# Patient Record
Sex: FEMALE | Race: WHITE
[De-identification: names, ages, dates, MRNs, and addresses within clinical notes are randomized per-mention and may not be internally consistent; named-entity substitution may affect disease eponyms.]

---

## 2017-07-05 ENCOUNTER — HOSPITAL ENCOUNTER (INPATIENT)
Dept: HOSPITAL 10 - OBT | Age: 27
LOS: 2 days | Discharge: HOME | End: 2017-07-07
Attending: OBSTETRICS & GYNECOLOGY | Admitting: OBSTETRICS & GYNECOLOGY
Payer: MEDICAID

## 2017-07-05 VITALS — DIASTOLIC BLOOD PRESSURE: 66 MMHG | RESPIRATION RATE: 18 BRPM | HEART RATE: 70 BPM | SYSTOLIC BLOOD PRESSURE: 113 MMHG

## 2017-07-05 VITALS — SYSTOLIC BLOOD PRESSURE: 113 MMHG | DIASTOLIC BLOOD PRESSURE: 64 MMHG | HEART RATE: 66 BPM | RESPIRATION RATE: 20 BRPM

## 2017-07-05 VITALS
BODY MASS INDEX: 26.33 KG/M2 | WEIGHT: 148.59 LBS | BODY MASS INDEX: 26.33 KG/M2 | WEIGHT: 148.59 LBS | HEIGHT: 63 IN | HEIGHT: 63 IN

## 2017-07-05 VITALS — RESPIRATION RATE: 18 BRPM | HEART RATE: 70 BPM | DIASTOLIC BLOOD PRESSURE: 59 MMHG | SYSTOLIC BLOOD PRESSURE: 107 MMHG

## 2017-07-05 VITALS — DIASTOLIC BLOOD PRESSURE: 65 MMHG | RESPIRATION RATE: 18 BRPM | HEART RATE: 75 BPM | SYSTOLIC BLOOD PRESSURE: 109 MMHG

## 2017-07-05 DIAGNOSIS — Z3A.34: ICD-10-CM

## 2017-07-05 LAB
ADD SCAN DIFF: NO
ADD UMIC: YES
APTT BLD: 26.7 SEC (ref 25–35)
BASOPHILS # BLD AUTO: 0 10^3/UL (ref 0–0.1)
BASOPHILS NFR BLD: 0.2 % (ref 0–2)
COLOR UR: YELLOW
EOSINOPHIL # BLD: 0.1 10^3/UL (ref 0–0.5)
EOSINOPHIL NFR BLD: 0.5 % (ref 0–7)
ERYTHROCYTE [DISTWIDTH] IN BLOOD BY AUTOMATED COUNT: 12.7 % (ref 11.5–14.5)
GLUCOSE UR STRIP-MCNC: NEGATIVE MG/DL
HCT VFR BLD CALC: 35.9 % (ref 37–47)
HGB BLD-MCNC: 12.2 G/DL (ref 12–16)
INR PPP: 0.9
KETONES UR STRIP.AUTO-MCNC: NEGATIVE MG/DL
LYMPHOCYTES # BLD AUTO: 1.3 10^3/UL (ref 0.8–2.9)
LYMPHOCYTES NFR BLD AUTO: 13.5 % (ref 15–51)
MCH RBC QN AUTO: 30.7 PG (ref 29–33)
MCHC RBC AUTO-ENTMCNC: 34 G/DL (ref 32–37)
MCV RBC AUTO: 90.2 FL (ref 82–101)
MONOCYTES # BLD: 0.6 10^3/UL (ref 0.3–0.9)
MONOCYTES NFR BLD: 5.7 % (ref 0–11)
NEUTROPHILS # BLD: 7.7 10^3/UL (ref 1.6–7.5)
NEUTROPHILS NFR BLD AUTO: 79.6 % (ref 39–77)
NITRITE UR QL STRIP.AUTO: NEGATIVE MG/DL
NRBC # BLD MANUAL: 0 10^3/UL (ref 0–0)
NRBC BLD QL: 0 /100WBC (ref 0–0)
PLATELET # BLD: 147 10^3/UL (ref 140–415)
PMV BLD AUTO: 12.3 FL (ref 7.4–10.4)
PROTHROMBIN TIME: 12.1 SEC (ref 12.2–14.2)
PT RATIO: 0.9
RBC # BLD AUTO: 3.98 10^6/UL (ref 4.2–5.4)
RBC # UR AUTO: NEGATIVE MG/DL
SQUAMOUS #/AREA URNS HPF: (no result) /HPF
UR ASCORBIC ACID: NEGATIVE MG/DL
UR BILIRUBIN (DIP): NEGATIVE MG/DL
UR CLARITY: (no result)
UR PH (DIP): 7 (ref 5–9)
UR RBC: 0 /HPF (ref 0–5)
UR SPECIFIC GRAVITY (DIP): 1.01 (ref 1–1.03)
UR TOTAL PROTEIN (DIP): NEGATIVE MG/DL
UROBILINOGEN UR STRIP-ACNC: NEGATIVE MG/DL
WBC # BLD AUTO: 9.6 10^3/UL (ref 4.8–10.8)
WBC # UR STRIP: (no result) LEU/UL

## 2017-07-05 PROCEDURE — 87086 URINE CULTURE/COLONY COUNT: CPT

## 2017-07-05 PROCEDURE — 81001 URINALYSIS AUTO W/SCOPE: CPT

## 2017-07-05 PROCEDURE — 80307 DRUG TEST PRSMV CHEM ANLYZR: CPT

## 2017-07-05 PROCEDURE — 90715 TDAP VACCINE 7 YRS/> IM: CPT

## 2017-07-05 PROCEDURE — G0463 HOSPITAL OUTPT CLINIC VISIT: HCPCS

## 2017-07-05 PROCEDURE — 88307 TISSUE EXAM BY PATHOLOGIST: CPT

## 2017-07-05 PROCEDURE — 85610 PROTHROMBIN TIME: CPT

## 2017-07-05 PROCEDURE — 85025 COMPLETE CBC W/AUTO DIFF WBC: CPT

## 2017-07-05 PROCEDURE — 87340 HEPATITIS B SURFACE AG IA: CPT

## 2017-07-05 PROCEDURE — 85730 THROMBOPLASTIN TIME PARTIAL: CPT

## 2017-07-05 PROCEDURE — 86592 SYPHILIS TEST NON-TREP QUAL: CPT

## 2017-07-05 PROCEDURE — 76818 FETAL BIOPHYS PROFILE W/NST: CPT

## 2017-07-05 PROCEDURE — 86900 BLOOD TYPING SEROLOGIC ABO: CPT

## 2017-07-05 PROCEDURE — 86901 BLOOD TYPING SEROLOGIC RH(D): CPT

## 2017-07-05 PROCEDURE — 36415 COLL VENOUS BLD VENIPUNCTURE: CPT

## 2017-07-05 RX ADMIN — PYRIDOXINE HYDROCHLORIDE SCH MLS/HR: 100 INJECTION, SOLUTION INTRAMUSCULAR; INTRAVENOUS at 18:56

## 2017-07-05 RX ADMIN — DOCUSATE SODIUM AND SENNOSIDES SCH TAB: 8.6; 5 TABLET, FILM COATED ORAL at 21:32

## 2017-07-05 RX ADMIN — PYRIDOXINE HYDROCHLORIDE SCH MLS/HR: 100 INJECTION, SOLUTION INTRAMUSCULAR; INTRAVENOUS at 16:34

## 2017-07-05 RX ADMIN — IBUPROFEN SCH MG: 600 TABLET ORAL at 23:04

## 2017-07-05 RX ADMIN — IBUPROFEN SCH MG: 600 TABLET ORAL at 16:47

## 2017-07-05 NOTE — RADRPT
PROCEDURE:   OB ultrasound for biophysical profile 

 

CLINICAL INDICATION:   Contractions

 

TECHNIQUE:   Multiple sonographic images of the pelvis were obtained.  Transabdominal views of the g
ravid uterus are available for review.  The images were reviewed on a PACS workstation.  

 

COMPARISON:   None 

 

FINDINGS:

 

Fetal breathing movement = 2/2

Fetal tone = 2/2

Fetal motion = 2/2

VANESSA = 2/2

 

VANESSA = 11.0 cm

Single live intrauterine pregnancy with fetal cardiac activity of 146 bpm.  Fetal position is cephal
ic.  The placenta is anterior.

 

IMPRESSION:

 

1.  Single live intrauterine gestation.  

2.  Biophysical profile = 8/8.

3.  VANESSA = 11.0 cm. 

 

 

RPTAT: HH

_____________________________________________ 

.Selena Bell MD, MD           Date    Time 

Electronically viewed and signed by .Selena Bell MD, MD on 07/05/2017 10:32 

 

D:  07/05/2017 10:32  T:  07/05/2017 10:32

.G/

## 2017-07-05 NOTE — LDN
Date/Time of Note


Date/Time of Note


DATE: 17 


TIME: 12:45





Delivery Summary





Weeks of Gestation


34 weeks and 1 day


Placenta Delivered:  Spontaneously


Meconium:  none


Episiotomy:  No


Perineal laceration:  0


Anesthesia type:  None


Estimated blood loss:  100


Sponge & Needle done & correct:  Yes


All needle counts correct:  Yes


Any foreign bodies felt in the:  No


Problems:  





Infant Delivery Information


Sex


Infant Sex:  male





Apgars


1 Minute:  9


5 Minute:  9





Suctioning


Nose & mouth suctioned at fatoumata:  Yes


Delee suction performed:  No





Umbilical Cord


Cord presentations:  no nuchal cord


Cord Blood was obtained:  Yes





Mother & Baby Disposition


Disposition


Mom transferred to:  Other (postpartum)


Baby to NICU:  Yes











ERIN BETHEA MD 2017 12:47

## 2017-07-05 NOTE — TRIAGE
===================================

OB Triage

===================================

Datetime Report Generated by CPN: 07/05/2017 11:42

   

   

===========================

Datetime: 07/05/2017 11:08

===========================

   

 Stage of Pregnancy:  Antepartum

   

===================================

Labor Evaluation

===================================

   

 Frequency:  3-4

 Monitor Mode:  External

 Duration (sec)2399:  50-90

 Quality:  Strong

 Pattern:  Normal: <= 5 Contractions in 10 Minutes

 Resting Tone Adwolf:  Relaxed

   

===================================

Fetal Heart Rate

===================================

   

 FHR Baseline Rate:  145

 Monitor Mode:  External US

 Variability:  Moderate 6-25 bpm

 Accelerations:  15X15

 Decelerations:  None

 Category:  Category I

 Comments:  NST REACTIVE FOR GESTATIONAL AGE 

   

===========================

Datetime: 07/05/2017 10:38

===========================

   

   

===================================

Vaginal Exam

===================================

   

 Dilatation (cms):  2.5

 Effacement (%):  80

 Station:  -1

 Exam By:  [email or web address]

 Vaginal Bleeding:  Normal Show

 Cervix, Consistency:  Soft

   

===========================

Datetime: 07/05/2017 09:43

===========================

   

   

===================================

Labor Evaluation

===================================

   

 Frequency:  3

 Monitor Mode:  External

 Duration (sec)2399:  50-80

 Quality:  Moderate

 Pattern:  Normal: <= 5 Contractions in 10 Minutes

 Resting Tone Adwolf:  Relaxed

   

===================================

Fetal Heart Rate

===================================

   

 FHR Baseline Rate:  135

 Monitor Mode:  External US

 Variability:  Moderate 6-25 bpm

 Accelerations:  15X15

 Decelerations:  None

 Category:  Category I

   

===========================

Datetime: 07/05/2017 09:28

===========================

   

   

===================================

Maternal Assessment

===================================

   

 Level of Consciousness:  Fully Conscious

 DTR's/Clonus:  DTRs 2+; No Clonus

 Headache:  Denies

 Blurred Vision:  No

 Respiratory Effort:  Unlabored; Regular Rhythm; Equal Expansion

 Breath Sounds, Left:  Clear and Equal

 Breath Sounds, Right:  Clear and Equal

 Nausea/Vomiting:  Denies

 RUQ Epigastric Pain:  Denies

 Facial Edema:  None

   

===================================

Fall Risk Assessment

===================================

   

 History of Falling:  (0) No

 Secondary Diagnosis:  (0) No

 Ambulatory Aid:  (0) Bedrest/Nurse Assist

 IV Therapy:  (0) No

 Gait:  (0) Normal/Bedrest/Immobile

 Mental Status:  (0) Oriented to Own Ability

 Fall Score:  0

 Fall Risk Score Definition:  No Risk: No action required

   

===========================

Datetime: 07/05/2017 09:27

===========================

   

 Stage of Pregnancy:  OB Triage

   

===================================

Maternal Assessment

===================================

   

 Level of Consciousness:  Fully Conscious

 DTR's/Clonus:  DTRs 2+; No Clonus

 Headache:  Denies

 Blurred Vision:  No

 Respiratory Effort:  Unlabored; Regular Rhythm; Equal Expansion

 Breath Sounds, Left:  Clear and Equal

 Breath Sounds, Right:  Clear and Equal

 Nausea/Vomiting:  Denies

 RUQ Epigastric Pain:  Denies

 Lower Extremities Edema:  None

     Degree:  None

 Upper Extremities Edema:  None

     Degree:  None

 Facial Edema:  None

 Temperature Route:  Axillary

   

===================================

Fall Risk Assessment

===================================

   

 History of Falling:  (0) No

 Secondary Diagnosis:  (0) No

 Ambulatory Aid:  (0) Bedrest/Nurse Assist

 IV Therapy:  (0) No

 Gait:  (0) Normal/Bedrest/Immobile

 Mental Status:  (0) Oriented to Own Ability

 Fall Score:  0

 Fall Risk Score Definition:  No Risk: No action required

   

===========================

Datetime: 07/05/2017 09:26

===========================

   

 Time of Arrival:  07/05/2017 09:10

 EGA:  34.1

 Arrived By:  Ambulatory

 Arrived From:  Home

 Chief Complaint:  UC'S SINCE 0800

 Fetal Movement:  Present

 Contractions:  Regular

 Time Contractions Began:  07/05/2017 08:00

 Contractions:  2-3

 Rupture of Membranes:  Denies

 Vaginal Bleeding:  None

 Vaginal Discharge:  Denies

 Recent Sexual Intercouse:  Denies

 Abdominal Trauma:  Not Applicable

 Patient Complaints:  Contractions

 Time Provider Notified:  07/05/2017 09:53

 Provider Notified:  DR. BETHEA

 Initial Plan:  NST AND CALL MD

## 2017-07-05 NOTE — QN
Documentation


Comment


27 years old  with IUP at 34 weeks and 1 day with prenatal care with Dr. Manrique. presented with  contractions in triage. She was initally 3 cm 

and rapidly progresssed to 8 cm and 100 % when I was called.


I was called to evaluate since the nursing staff felt a soft area at the cevix 

. Questionable for placenta


Attended to patient's bedside.


Complaining of contractions and pain.  Declined IV pain medication quick 

bedside ultrasound performed.  Vertex presentation.  Placenta fundal anterior.





Speculum examination: Bulging bag of water noted.  No clear evidence of 

placenta noted.


exam 8/100%./0 


NST: Category 1


Dr. sherrie browne primary obstetrician is on her way.  Patient received a dose of 

steroid





Due to advanced cervical dilatation magnesium that was a started stopped.  

Ampicillin started immediately., 


NICU was notified.


Prenatal records reviewed.  Dating good by LMP consistent with 30 weeks 

ultrasound.  Patient had good prenatal care.  There is no


Antepartum complication.


Anticipate 


Primary obstetrician it is on her way to perform the delivery


We will continue to standby











ROBERTO ALVARADO MD 2017 12:25

## 2017-07-05 NOTE — HP
Date/Time of Note


Date/Time of Note


DATE: 17 


TIME: 12:40





OB - History


Hx of Present Pregnancy


Chief Complaint:  contractions


Estimated Due Date:  Aug 15, 2017


:  3


Para:  1


Spontaneous :  1


Therapeutic :  1


Prenatal Care:  Good Care


Ultrasounds:  Normal mid trimester US


Obstetrical Complications:  None


Medical Complications:  None





Past Family/Social History


*


Past Medical, Surgical, Family and Obstetric Histories reviewed from prenatal 

chart.


GBS Status:  Unknown





OB  Admission Exam


Vital Signs


Vital Signs





 Vital Signs








  Date Time  Temp Pulse Resp B/P Pulse Ox O2 Delivery O2 Flow Rate FiO2


 


17 09:58 97.8 66 20 113/64 99 Room Air  











Physical Exam


HEENT:  WNL


Heart:  Rhythm Normal


Lungs:  Clear


Abdomen:  WNL


Extremities:  Normal


Cervical Dilatation:  3cm


Effacement:  75%


Station:  -1


Membranes:  Intact


Fetal Heart Rate:  130's


Accelerations:  Accelerations Present


Decelerations:  No Decelerations


Varibility:  Moderate


Last 72 hours Lab Results


 CBC & BMP


17 10:45











OB  Assessment/Plan


Reason for admission:   labor


Plan:  Other (Admit, tocolysis with magnesium sulfate attewpted, however, 

patient progressed rapidly. Expect .)











ERIN BETHEA MD 2017 12:44

## 2017-07-06 VITALS — RESPIRATION RATE: 14 BRPM | HEART RATE: 66 BPM | SYSTOLIC BLOOD PRESSURE: 102 MMHG | DIASTOLIC BLOOD PRESSURE: 57 MMHG

## 2017-07-06 VITALS — HEART RATE: 68 BPM | DIASTOLIC BLOOD PRESSURE: 64 MMHG | RESPIRATION RATE: 17 BRPM | SYSTOLIC BLOOD PRESSURE: 106 MMHG

## 2017-07-06 VITALS — SYSTOLIC BLOOD PRESSURE: 103 MMHG | DIASTOLIC BLOOD PRESSURE: 61 MMHG | RESPIRATION RATE: 74 BRPM | HEART RATE: 74 BPM

## 2017-07-06 VITALS — DIASTOLIC BLOOD PRESSURE: 59 MMHG | RESPIRATION RATE: 18 BRPM | HEART RATE: 18 BPM | SYSTOLIC BLOOD PRESSURE: 103 MMHG

## 2017-07-06 VITALS — HEART RATE: 74 BPM | RESPIRATION RATE: 16 BRPM | SYSTOLIC BLOOD PRESSURE: 90 MMHG | DIASTOLIC BLOOD PRESSURE: 55 MMHG

## 2017-07-06 LAB
ADD SCAN DIFF: NO
BASOPHILS # BLD AUTO: 0 10^3/UL (ref 0–0.1)
BASOPHILS NFR BLD: 0.2 % (ref 0–2)
EOSINOPHIL # BLD: 0 10^3/UL (ref 0–0.5)
EOSINOPHIL NFR BLD: 0.1 % (ref 0–7)
ERYTHROCYTE [DISTWIDTH] IN BLOOD BY AUTOMATED COUNT: 12.9 % (ref 11.5–14.5)
HCT VFR BLD CALC: 30.8 % (ref 37–47)
HGB BLD-MCNC: 10.3 G/DL (ref 12–16)
LYMPHOCYTES # BLD AUTO: 1.3 10^3/UL (ref 0.8–2.9)
LYMPHOCYTES NFR BLD AUTO: 7.1 % (ref 15–51)
MCH RBC QN AUTO: 30.3 PG (ref 29–33)
MCHC RBC AUTO-ENTMCNC: 33.4 G/DL (ref 32–37)
MCV RBC AUTO: 90.6 FL (ref 82–101)
MONOCYTES # BLD: 0.9 10^3/UL (ref 0.3–0.9)
MONOCYTES NFR BLD: 5 % (ref 0–11)
NEUTROPHILS # BLD: 15.6 10^3/UL (ref 1.6–7.5)
NEUTROPHILS NFR BLD AUTO: 87.2 % (ref 39–77)
NRBC # BLD MANUAL: 0 10^3/UL (ref 0–0)
NRBC BLD QL: 0 /100WBC (ref 0–0)
PLATELET # BLD: 137 10^3/UL (ref 140–415)
PMV BLD AUTO: 11.7 FL (ref 7.4–10.4)
RBC # BLD AUTO: 3.4 10^6/UL (ref 4.2–5.4)
WBC # BLD AUTO: 17.9 10^3/UL (ref 4.8–10.8)

## 2017-07-06 RX ADMIN — IBUPROFEN SCH MG: 600 TABLET ORAL at 11:59

## 2017-07-06 RX ADMIN — IBUPROFEN SCH MG: 600 TABLET ORAL at 18:13

## 2017-07-06 RX ADMIN — IBUPROFEN SCH MG: 600 TABLET ORAL at 23:42

## 2017-07-06 RX ADMIN — IBUPROFEN SCH MG: 600 TABLET ORAL at 05:36

## 2017-07-06 RX ADMIN — DOCUSATE SODIUM AND SENNOSIDES SCH TAB: 8.6; 5 TABLET, FILM COATED ORAL at 09:05

## 2017-07-06 RX ADMIN — DOCUSATE SODIUM AND SENNOSIDES SCH TAB: 8.6; 5 TABLET, FILM COATED ORAL at 23:42

## 2017-07-06 NOTE — QN
Documentation


Comment


No complaint


Afebrile VSS


Fundus Firm


Lochia scant


PPD #1


Stable


Continue present care.











ERIN BETHEA MD Jul 6, 2017 19:12

## 2017-07-07 VITALS — RESPIRATION RATE: 18 BRPM | SYSTOLIC BLOOD PRESSURE: 118 MMHG | DIASTOLIC BLOOD PRESSURE: 77 MMHG | HEART RATE: 77 BPM

## 2017-07-07 VITALS — SYSTOLIC BLOOD PRESSURE: 99 MMHG | HEART RATE: 56 BPM | DIASTOLIC BLOOD PRESSURE: 62 MMHG | RESPIRATION RATE: 18 BRPM

## 2017-07-07 VITALS — HEART RATE: 65 BPM | SYSTOLIC BLOOD PRESSURE: 105 MMHG | RESPIRATION RATE: 19 BRPM | DIASTOLIC BLOOD PRESSURE: 69 MMHG

## 2017-07-07 LAB
ADD SCAN DIFF: NO
BASOPHILS # BLD AUTO: 0 10^3/UL (ref 0–0.1)
BASOPHILS NFR BLD: 0.3 % (ref 0–2)
EOSINOPHIL # BLD: 0.2 10^3/UL (ref 0–0.5)
EOSINOPHIL NFR BLD: 1.3 % (ref 0–7)
ERYTHROCYTE [DISTWIDTH] IN BLOOD BY AUTOMATED COUNT: 13.1 % (ref 11.5–14.5)
HCT VFR BLD CALC: 30.8 % (ref 37–47)
HGB BLD-MCNC: 10 G/DL (ref 12–16)
LYMPHOCYTES # BLD AUTO: 2 10^3/UL (ref 0.8–2.9)
LYMPHOCYTES NFR BLD AUTO: 16.6 % (ref 15–51)
MCH RBC QN AUTO: 29.7 PG (ref 29–33)
MCHC RBC AUTO-ENTMCNC: 32.5 G/DL (ref 32–37)
MCV RBC AUTO: 91.4 FL (ref 82–101)
MONOCYTES # BLD: 0.7 10^3/UL (ref 0.3–0.9)
MONOCYTES NFR BLD: 5.5 % (ref 0–11)
NEUTROPHILS # BLD: 9.1 10^3/UL (ref 1.6–7.5)
NEUTROPHILS NFR BLD AUTO: 75.5 % (ref 39–77)
NRBC # BLD MANUAL: 0 10^3/UL (ref 0–0)
NRBC BLD QL: 0 /100WBC (ref 0–0)
PLATELET # BLD: 139 10^3/UL (ref 140–415)
PMV BLD AUTO: 11.8 FL (ref 7.4–10.4)
RBC # BLD AUTO: 3.37 10^6/UL (ref 4.2–5.4)
WBC # BLD AUTO: 12 10^3/UL (ref 4.8–10.8)

## 2017-07-07 RX ADMIN — IBUPROFEN SCH MG: 600 TABLET ORAL at 17:48

## 2017-07-07 RX ADMIN — IBUPROFEN SCH MG: 600 TABLET ORAL at 12:15

## 2017-07-07 RX ADMIN — DOCUSATE SODIUM AND SENNOSIDES SCH TAB: 8.6; 5 TABLET, FILM COATED ORAL at 08:19

## 2017-07-07 RX ADMIN — IBUPROFEN SCH MG: 600 TABLET ORAL at 05:47

## 2017-07-07 NOTE — DS
Date/Time of Note


Date/Time of Note


DATE: 17 


TIME: 19:55





Obstetrical Discharge Record


Final Diagnosis


Final Diagnosis:   delivered





Vaginal Delivery


Obstetrical Delivery:  Spontaneous





Complications


 Labor


Tocolytics:  Magnesium Sulfate





Condition on Discharge


Physical Assessment


Voiding:  Yes


Bowel Movement:  Yes


Breast:  Soft, non-tender


Fundus:  Firm


Calf Tenderness:  No


Patient Condition:  Stable











ERIN BETHEA MD 2017 19:56

## 2019-03-04 ENCOUNTER — HOSPITAL ENCOUNTER (INPATIENT)
Dept: HOSPITAL 10 - L-D | Age: 29
LOS: 3 days | Discharge: HOME | End: 2019-03-07
Attending: OBSTETRICS & GYNECOLOGY | Admitting: OBSTETRICS & GYNECOLOGY
Payer: MEDICAID

## 2019-03-04 ENCOUNTER — HOSPITAL ENCOUNTER (INPATIENT)
Dept: HOSPITAL 91 - L-D | Age: 29
LOS: 3 days | Discharge: HOME | End: 2019-03-07
Payer: MEDICAID

## 2019-03-04 VITALS
WEIGHT: 160.94 LBS | HEIGHT: 63 IN | WEIGHT: 160.94 LBS | BODY MASS INDEX: 28.52 KG/M2 | BODY MASS INDEX: 28.52 KG/M2 | HEIGHT: 63 IN

## 2019-03-04 DIAGNOSIS — Z23: ICD-10-CM

## 2019-03-04 DIAGNOSIS — Z3A.38: ICD-10-CM

## 2019-03-04 LAB
ADD MAN DIFF?: NO
BASOPHIL #: 0 10^3/UL (ref 0–0.1)
BASOPHILS %: 0.3 % (ref 0–2)
EOSINOPHILS #: 0.1 10^3/UL (ref 0–0.5)
EOSINOPHILS %: 1.6 % (ref 0–7)
GLUCOSE: 86 MG/DL (ref 70–220)
HEMATOCRIT: 32.8 % (ref 37–47)
HEMOGLOBIN: 10.4 G/DL (ref 12–16)
HIV 1&2 ANTIBODY: NEGATIVE
IMMATURE GRANS #M: 0.03 10^3/UL (ref 0–0.03)
IMMATURE GRANS % (M): 0.5 % (ref 0–0.43)
INR: 0.94
LYMPHOCYTES #: 1 10^3/UL (ref 0.8–2.9)
LYMPHOCYTES %: 16.2 % (ref 15–51)
MEAN CORPUSCULAR HEMOGLOBIN: 27.2 PG (ref 29–33)
MEAN CORPUSCULAR HGB CONC: 31.7 G/DL (ref 32–37)
MEAN CORPUSCULAR VOLUME: 85.6 FL (ref 82–101)
MEAN PLATELET VOLUME: 11.9 FL (ref 7.4–10.4)
MONOCYTE #: 0.5 10^3/UL (ref 0.3–0.9)
MONOCYTES %: 7.3 % (ref 0–11)
NEUTROPHIL #: 4.7 10^3/UL (ref 1.6–7.5)
NEUTROPHILS %: 74.1 % (ref 39–77)
NUCLEATED RED BLOOD CELLS #: 0 10^3/UL (ref 0–0)
NUCLEATED RED BLOOD CELLS%: 0 /100WBC (ref 0–0)
PARTIAL THROMBOPLASTIN TIME: 24.7 SEC (ref 23–35)
PLATELET COUNT: 146 10^3/UL (ref 140–415)
PROTIME: 12.7 SEC (ref 11.9–14.9)
PT RATIO: 1
RAPID PLASMA REAGIN: NONREACTIVE
RED BLOOD COUNT: 3.83 10^6/UL (ref 4.2–5.4)
RED CELL DISTRIBUTION WIDTH: 14.6 % (ref 11.5–14.5)
WHITE BLOOD COUNT: 6.3 10^3/UL (ref 4.8–10.8)

## 2019-03-04 PROCEDURE — 82947 ASSAY GLUCOSE BLOOD QUANT: CPT

## 2019-03-04 PROCEDURE — 62319: CPT

## 2019-03-04 PROCEDURE — 85610 PROTHROMBIN TIME: CPT

## 2019-03-04 PROCEDURE — 80053 COMPREHEN METABOLIC PANEL: CPT

## 2019-03-04 PROCEDURE — 81001 URINALYSIS AUTO W/SCOPE: CPT

## 2019-03-04 PROCEDURE — 84560 ASSAY OF URINE/URIC ACID: CPT

## 2019-03-04 PROCEDURE — 86703 HIV-1/HIV-2 1 RESULT ANTBDY: CPT

## 2019-03-04 PROCEDURE — 85025 COMPLETE CBC W/AUTO DIFF WBC: CPT

## 2019-03-04 PROCEDURE — 82962 GLUCOSE BLOOD TEST: CPT

## 2019-03-04 PROCEDURE — 85730 THROMBOPLASTIN TIME PARTIAL: CPT

## 2019-03-04 PROCEDURE — 90715 TDAP VACCINE 7 YRS/> IM: CPT

## 2019-03-04 PROCEDURE — 86900 BLOOD TYPING SEROLOGIC ABO: CPT

## 2019-03-04 PROCEDURE — 86592 SYPHILIS TEST NON-TREP QUAL: CPT

## 2019-03-04 PROCEDURE — 86850 RBC ANTIBODY SCREEN: CPT

## 2019-03-04 PROCEDURE — 86901 BLOOD TYPING SEROLOGIC RH(D): CPT

## 2019-03-04 PROCEDURE — 87340 HEPATITIS B SURFACE AG IA: CPT

## 2019-03-04 RX ADMIN — PYRIDOXINE HYDROCHLORIDE SCH MLS/HR: 100 INJECTION, SOLUTION INTRAMUSCULAR; INTRAVENOUS at 13:24

## 2019-03-04 RX ADMIN — PYRIDOXINE HYDROCHLORIDE SCH MLS/HR: 100 INJECTION, SOLUTION INTRAMUSCULAR; INTRAVENOUS at 20:50

## 2019-03-04 RX ADMIN — OXYTOCIN 1 MLS/HR: 10 INJECTION, SOLUTION INTRAMUSCULAR; INTRAVENOUS at 17:25

## 2019-03-04 RX ADMIN — PYRIDOXINE HYDROCHLORIDE 1 MLS/HR: 100 INJECTION, SOLUTION INTRAMUSCULAR; INTRAVENOUS at 20:50

## 2019-03-04 RX ADMIN — POTASSIUM CHLORIDE SCH MLS/HR: 2 INJECTION, SOLUTION, CONCENTRATE INTRAVENOUS at 17:25

## 2019-03-04 RX ADMIN — PYRIDOXINE HYDROCHLORIDE 1 MLS/HR: 100 INJECTION, SOLUTION INTRAMUSCULAR; INTRAVENOUS at 13:24

## 2019-03-04 NOTE — HP
Date/Time of Note


Date/Time of Note


DATE: 3/4/19 


TIME: 18:41





OB - History


Hx of Present Pregnancy


Chief Complaint:  referred from NST


Estimated Due Date:  Mar 19, 2019


:  4


Para:  2


Spontaneous :  1


Therapeutic :  0


Prenatal Care:  Good Care


Ultrasounds:  Normal mid trimester US


Obstetrical Complications:  Gestational Diabetes





Past Family/Social History


*


Past Medical, Surgical, Family and Obstetric Histories reviewed from prenatal 


chart.


GBS Status:  Negative





OB  Admission Exam


Physical Exam


HEENT:  WNL


Heart:  Rhythm Normal


Lungs:  Clear, Equal


Abdomen:  WNL


Extremities:  Normal


Reflexes:  Normal


Cervical Dilatation:  3cm


Effacement:  75%


Station:  -1


Membranes:  Intact


Fetal Heart Rate:  120's


Accelerations:  Accelerations Present


Decelerations:  Variable Decelerations


Varibility:  Moderate


Last 72 hourBlood Glucose





Bedside Glucose - 72 Hours


                      Test
                  3/4/19
17:28


                      Bedside Glucose
  90 mg/dL
()





Last 72 hours Lab Results


                                    CBC & BMP


3/4/19 13:00











OB  Assessment/Plan


Other Assessment:


Early labor


Fetal heart deceleration noted in NST clinic


Plan:  Expectant Management











ERIN BETHEA MD             Mar 4, 2019 18:46

## 2019-03-05 VITALS — RESPIRATION RATE: 18 BRPM | SYSTOLIC BLOOD PRESSURE: 124 MMHG | HEART RATE: 81 BPM | DIASTOLIC BLOOD PRESSURE: 67 MMHG

## 2019-03-05 VITALS — SYSTOLIC BLOOD PRESSURE: 121 MMHG | DIASTOLIC BLOOD PRESSURE: 68 MMHG | RESPIRATION RATE: 18 BRPM | HEART RATE: 89 BPM

## 2019-03-05 LAB
ADD MAN DIFF?: NO
ADD UMIC: YES
ALANINE AMINOTRANSFERASE: < 6 IU/L (ref 13–69)
ALBUMIN/GLOBULIN RATIO: 0.96
ALBUMIN: 3.1 G/DL (ref 3.3–4.9)
ALKALINE PHOSPHATASE: 155 IU/L (ref 42–121)
ANION GAP: 9 (ref 5–13)
ASPARTATE AMINO TRANSFERASE: 19 IU/L (ref 15–46)
BASOPHIL #: 0 10^3/UL (ref 0–0.1)
BASOPHILS %: 0.2 % (ref 0–2)
BILIRUBIN,DIRECT: 0 MG/DL (ref 0–0.2)
BILIRUBIN,TOTAL: 0.1 MG/DL (ref 0.2–1.3)
BLOOD UREA NITROGEN: 6 MG/DL (ref 7–20)
CALCIUM: 8.8 MG/DL (ref 8.4–10.2)
CARBON DIOXIDE: 24 MMOL/L (ref 21–31)
CHLORIDE: 105 MMOL/L (ref 97–110)
CREATININE: 0.5 MG/DL (ref 0.44–1)
EOSINOPHILS #: 0 10^3/UL (ref 0–0.5)
EOSINOPHILS %: 0.4 % (ref 0–7)
GLOBULIN: 3.2 G/DL (ref 1.3–3.2)
GLUCOSE: 86 MG/DL (ref 70–220)
HEMATOCRIT: 34.5 % (ref 37–47)
HEMOGLOBIN: 10.9 G/DL (ref 12–16)
HEPATITIS B SURFACE ANTIGEN: NEGATIVE
IMMATURE GRANS #M: 0.06 10^3/UL (ref 0–0.03)
IMMATURE GRANS % (M): 0.5 % (ref 0–0.43)
LYMPHOCYTES #: 1 10^3/UL (ref 0.8–2.9)
LYMPHOCYTES %: 8.7 % (ref 15–51)
MEAN CORPUSCULAR HEMOGLOBIN: 27.3 PG (ref 29–33)
MEAN CORPUSCULAR HGB CONC: 31.6 G/DL (ref 32–37)
MEAN CORPUSCULAR VOLUME: 86.5 FL (ref 82–101)
MEAN PLATELET VOLUME: 12.1 FL (ref 7.4–10.4)
MONOCYTE #: 0.4 10^3/UL (ref 0.3–0.9)
MONOCYTES %: 3.3 % (ref 0–11)
NEUTROPHIL #: 9.5 10^3/UL (ref 1.6–7.5)
NEUTROPHILS %: 86.9 % (ref 39–77)
NUCLEATED RED BLOOD CELLS #: 0 10^3/UL (ref 0–0)
NUCLEATED RED BLOOD CELLS%: 0 /100WBC (ref 0–0)
PLATELET COUNT: 128 10^3/UL (ref 140–415)
POTASSIUM: 4 MMOL/L (ref 3.5–5.1)
RED BLOOD COUNT: 3.99 10^6/UL (ref 4.2–5.4)
RED CELL DISTRIBUTION WIDTH: 14.7 % (ref 11.5–14.5)
SODIUM: 138 MMOL/L (ref 135–144)
TOTAL PROTEIN: 6.3 G/DL (ref 6.1–8.1)
UR ASCORBIC ACID: NEGATIVE MG/DL
UR BACTERIA: (no result) /HPF
UR BILIRUBIN (DIP): NEGATIVE MG/DL
UR BLOOD (DIP): (no result) MG/DL
UR CLARITY: CLEAR
UR COLOR: COLORLESS
UR GLUCOSE (DIP): NEGATIVE MG/DL
UR KETONES (DIP): NEGATIVE MG/DL
UR LEUKOCYTE ESTERASE (DIP): NEGATIVE LEU/UL
UR NITRITE (DIP): NEGATIVE MG/DL
UR PH (DIP): 7 (ref 5–9)
UR RBC: 1 /HPF (ref 0–5)
UR SPECIFIC GRAVITY (DIP): 1 (ref 1–1.03)
UR TOTAL PROTEIN (DIP): NEGATIVE MG/DL
UR UROBILINOGEN (DIP): NEGATIVE MG/DL
UR WBC: 1 /HPF (ref 0–5)
URIC ACID: 6.1 MG/DL (ref 3.1–7.9)
WHITE BLOOD COUNT: 10.9 10^3/UL (ref 4.8–10.8)

## 2019-03-05 RX ADMIN — FENTANYL CIT 0.2 MG/100ML-ROPIV 0.2%-NACL 0.9% EPIDURAL INJ SCH ML: 2/0.2 SOLUTION at 10:23

## 2019-03-05 RX ADMIN — POTASSIUM CHLORIDE SCH MLS/HR: 2 INJECTION, SOLUTION, CONCENTRATE INTRAVENOUS at 17:30

## 2019-03-05 RX ADMIN — OXYTOCIN 1 MLS/HR: 10 INJECTION, SOLUTION INTRAMUSCULAR; INTRAVENOUS at 17:30

## 2019-03-05 RX ADMIN — PYRIDOXINE HYDROCHLORIDE 1 MLS/HR: 100 INJECTION, SOLUTION INTRAMUSCULAR; INTRAVENOUS at 00:12

## 2019-03-05 RX ADMIN — PYRIDOXINE HYDROCHLORIDE SCH MLS/HR: 100 INJECTION, SOLUTION INTRAMUSCULAR; INTRAVENOUS at 10:21

## 2019-03-05 RX ADMIN — POTASSIUM CHLORIDE SCH MLS/HR: 2 INJECTION, SOLUTION, CONCENTRATE INTRAVENOUS at 00:00

## 2019-03-05 RX ADMIN — Medication 1 MLS/HR: at 22:04

## 2019-03-05 RX ADMIN — HYDROCODONE BITARTRATE AND ACETAMINOPHEN 1 TAB: 5; 325 TABLET ORAL at 22:00

## 2019-03-05 RX ADMIN — Medication 1 SPRAY: at 23:17

## 2019-03-05 RX ADMIN — PYRIDOXINE HYDROCHLORIDE 1 MLS/HR: 100 INJECTION, SOLUTION INTRAMUSCULAR; INTRAVENOUS at 21:05

## 2019-03-05 RX ADMIN — PYRIDOXINE HYDROCHLORIDE SCH MLS/HR: 100 INJECTION, SOLUTION INTRAMUSCULAR; INTRAVENOUS at 21:05

## 2019-03-05 RX ADMIN — IBUPROFEN 1 MG: 600 TABLET ORAL at 23:32

## 2019-03-05 RX ADMIN — FENTANYL CIT 0.2 MG/100ML-ROPIV 0.2%-NACL 0.9% EPIDURAL INJ 1 ML: 2/0.2 SOLUTION at 02:06

## 2019-03-05 RX ADMIN — IBUPROFEN SCH MG: 600 TABLET ORAL at 23:32

## 2019-03-05 RX ADMIN — WITCH HAZEL 1 PAD: 500 SOLUTION RECTAL; TOPICAL at 23:17

## 2019-03-05 RX ADMIN — PYRIDOXINE HYDROCHLORIDE 1 MLS/HR: 100 INJECTION, SOLUTION INTRAMUSCULAR; INTRAVENOUS at 10:21

## 2019-03-05 RX ADMIN — OXYTOCIN 1 MLS/HR: 10 INJECTION, SOLUTION INTRAMUSCULAR; INTRAVENOUS at 00:00

## 2019-03-05 RX ADMIN — OXYTOCIN 1 MLS/HR: 10 INJECTION, SOLUTION INTRAMUSCULAR; INTRAVENOUS at 07:21

## 2019-03-05 RX ADMIN — PYRIDOXINE HYDROCHLORIDE SCH MLS/HR: 100 INJECTION, SOLUTION INTRAMUSCULAR; INTRAVENOUS at 00:12

## 2019-03-05 RX ADMIN — FENTANYL CIT 0.2 MG/100ML-ROPIV 0.2%-NACL 0.9% EPIDURAL INJ SCH ML: 2/0.2 SOLUTION at 02:06

## 2019-03-05 RX ADMIN — POTASSIUM CHLORIDE SCH MLS/HR: 2 INJECTION, SOLUTION, CONCENTRATE INTRAVENOUS at 07:21

## 2019-03-05 RX ADMIN — Medication 1 MLS/HR: at 11:53

## 2019-03-05 RX ADMIN — Medication 1 MLS/HR: at 17:54

## 2019-03-05 RX ADMIN — IBUPROFEN 1 MG: 600 TABLET ORAL at 20:08

## 2019-03-05 RX ADMIN — Medication 1 MLS/HR: at 17:50

## 2019-03-05 RX ADMIN — HYDROCODONE BITARTRATE AND ACETAMINOPHEN PRN TAB: 5; 325 TABLET ORAL at 22:00

## 2019-03-05 RX ADMIN — FENTANYL CIT 0.2 MG/100ML-ROPIV 0.2%-NACL 0.9% EPIDURAL INJ 1 ML: 2/0.2 SOLUTION at 10:23

## 2019-03-05 NOTE — PAC
Date/Time of Note


Date/Time of Note


DATE: 3/5/19 


TIME: 01:32





Post-Anesthesia Notes


Post-Anesthesia Note


Last documented vital signs


T:98.1


Activity:  WNL


Respiratory function:  WNL


Cardiovascular function:  WNL


Mental status:  Baseline


Pain reasonably controlled:  Yes


Hydration appropriate:  Yes


Nausea/Vomiting absent:  Yes











ALISHA CORREA MD               Mar 5, 2019 01:33

## 2019-03-05 NOTE — LDN
Date/Time of Note


Date/Time of Note


DATE: 3/5/19 


TIME: 17:46





Delivery Summary





Weeks of Gestation


38 weeks


Placenta Delivered:  Spontaneously


Meconium:  none


Episiotomy:  No


Anesthesia type:  Epidural


Estimated blood loss:  200


Sponge & Needle done & correct:  Yes


All needle counts correct:  Yes


Any foreign bodies felt in the:  No





Infant Delivery Information


Sex


Infant Sex:  female





Apgars


1 Minute:  9


5 Minute:  9





Suctioning


Nose & mouth suctioned at fatoumata:  No


Delee suction performed:  No





Umbilical Cord


Umbilical cord with:  3 Vessels


Cord presentations:  nuchal cord


Nuchal cord present X:  1


Cord Blood was obtained:  Yes





Mother & Baby Disposition


Disposition


Mom & Baby to Maternity; Good:  Yes











ERIN BETHEA MD             Mar 5, 2019 17:48

## 2019-03-06 VITALS — HEART RATE: 76 BPM | RESPIRATION RATE: 16 BRPM | DIASTOLIC BLOOD PRESSURE: 62 MMHG | SYSTOLIC BLOOD PRESSURE: 95 MMHG

## 2019-03-06 VITALS — RESPIRATION RATE: 18 BRPM | HEART RATE: 65 BPM | SYSTOLIC BLOOD PRESSURE: 112 MMHG | DIASTOLIC BLOOD PRESSURE: 66 MMHG

## 2019-03-06 VITALS — RESPIRATION RATE: 18 BRPM | DIASTOLIC BLOOD PRESSURE: 58 MMHG | HEART RATE: 68 BPM | SYSTOLIC BLOOD PRESSURE: 100 MMHG

## 2019-03-06 VITALS — SYSTOLIC BLOOD PRESSURE: 115 MMHG | DIASTOLIC BLOOD PRESSURE: 74 MMHG | HEART RATE: 70 BPM

## 2019-03-06 VITALS — RESPIRATION RATE: 18 BRPM | HEART RATE: 72 BPM | SYSTOLIC BLOOD PRESSURE: 117 MMHG | DIASTOLIC BLOOD PRESSURE: 63 MMHG

## 2019-03-06 LAB
ADD MAN DIFF?: NO
BASOPHIL #: 0 10^3/UL (ref 0–0.1)
BASOPHILS %: 0.3 % (ref 0–2)
EOSINOPHILS #: 0.1 10^3/UL (ref 0–0.5)
EOSINOPHILS %: 0.9 % (ref 0–7)
HEMATOCRIT: 32.1 % (ref 37–47)
HEMOGLOBIN: 10.2 G/DL (ref 12–16)
IMMATURE GRANS #M: 0.06 10^3/UL (ref 0–0.03)
IMMATURE GRANS % (M): 0.7 % (ref 0–0.43)
LYMPHOCYTES #: 0.8 10^3/UL (ref 0.8–2.9)
LYMPHOCYTES %: 9 % (ref 15–51)
MEAN CORPUSCULAR HEMOGLOBIN: 26.6 PG (ref 29–33)
MEAN CORPUSCULAR HGB CONC: 31.8 G/DL (ref 32–37)
MEAN CORPUSCULAR VOLUME: 83.8 FL (ref 82–101)
MEAN PLATELET VOLUME: 12.1 FL (ref 7.4–10.4)
MONOCYTE #: 0.5 10^3/UL (ref 0.3–0.9)
MONOCYTES %: 5.8 % (ref 0–11)
NEUTROPHIL #: 7.7 10^3/UL (ref 1.6–7.5)
NEUTROPHILS %: 83.3 % (ref 39–77)
NUCLEATED RED BLOOD CELLS #: 0 10^3/UL (ref 0–0)
NUCLEATED RED BLOOD CELLS%: 0 /100WBC (ref 0–0)
PLATELET COUNT: 108 10^3/UL (ref 140–415)
RED BLOOD COUNT: 3.83 10^6/UL (ref 4.2–5.4)
RED CELL DISTRIBUTION WIDTH: 14.7 % (ref 11.5–14.5)
WHITE BLOOD COUNT: 9.2 10^3/UL (ref 4.8–10.8)

## 2019-03-06 RX ADMIN — IBUPROFEN 1 MG: 600 TABLET ORAL at 12:11

## 2019-03-06 RX ADMIN — IBUPROFEN 1 MG: 600 TABLET ORAL at 05:18

## 2019-03-06 RX ADMIN — IBUPROFEN 1 MG: 600 TABLET ORAL at 18:24

## 2019-03-06 RX ADMIN — IBUPROFEN SCH MG: 600 TABLET ORAL at 12:11

## 2019-03-06 RX ADMIN — HYDROCODONE BITARTRATE AND ACETAMINOPHEN PRN TAB: 5; 325 TABLET ORAL at 08:21

## 2019-03-06 RX ADMIN — DIBUCAINE 1 APPLIC: 0.28 OINTMENT TOPICAL at 08:22

## 2019-03-06 RX ADMIN — DOCUSATE SODIUM AND SENNOSIDES SCH TAB: 8.6; 5 TABLET, FILM COATED ORAL at 20:40

## 2019-03-06 RX ADMIN — IBUPROFEN SCH MG: 600 TABLET ORAL at 18:24

## 2019-03-06 RX ADMIN — PYRIDOXINE HYDROCHLORIDE 1 MLS/HR: 100 INJECTION, SOLUTION INTRAMUSCULAR; INTRAVENOUS at 04:41

## 2019-03-06 RX ADMIN — IBUPROFEN SCH MG: 600 TABLET ORAL at 05:18

## 2019-03-06 RX ADMIN — DOCUSATE SODIUM AND SENNOSIDES 1 TAB: 8.6; 5 TABLET, FILM COATED ORAL at 08:20

## 2019-03-06 RX ADMIN — PYRIDOXINE HYDROCHLORIDE SCH MLS/HR: 100 INJECTION, SOLUTION INTRAMUSCULAR; INTRAVENOUS at 04:41

## 2019-03-06 RX ADMIN — DOCUSATE SODIUM AND SENNOSIDES SCH TAB: 8.6; 5 TABLET, FILM COATED ORAL at 08:20

## 2019-03-06 RX ADMIN — DOCUSATE SODIUM AND SENNOSIDES 1 TAB: 8.6; 5 TABLET, FILM COATED ORAL at 20:40

## 2019-03-06 RX ADMIN — HYDROCODONE BITARTRATE AND ACETAMINOPHEN 1 TAB: 5; 325 TABLET ORAL at 08:21

## 2019-03-06 NOTE — QN
Documentation


Comment


No complaint


Afebrile VSS


Fundus firm


Lochia scant


PPD #1


Stable


Continue present care.











ERIN BETHEA MD             Mar 6, 2019 19:47

## 2019-03-07 VITALS — SYSTOLIC BLOOD PRESSURE: 124 MMHG | HEART RATE: 62 BPM | RESPIRATION RATE: 18 BRPM | DIASTOLIC BLOOD PRESSURE: 78 MMHG

## 2019-03-07 VITALS — HEART RATE: 68 BPM | DIASTOLIC BLOOD PRESSURE: 68 MMHG | RESPIRATION RATE: 18 BRPM | SYSTOLIC BLOOD PRESSURE: 110 MMHG

## 2019-03-07 LAB
ADD MAN DIFF?: NO
BASOPHIL #: 0 10^3/UL (ref 0–0.1)
BASOPHILS %: 0.4 % (ref 0–2)
EOSINOPHILS #: 0.2 10^3/UL (ref 0–0.5)
EOSINOPHILS %: 2.5 % (ref 0–7)
HEMATOCRIT: 32.4 % (ref 37–47)
HEMOGLOBIN: 10 G/DL (ref 12–16)
IMMATURE GRANS #M: 0.06 10^3/UL (ref 0–0.03)
IMMATURE GRANS % (M): 0.9 % (ref 0–0.43)
LYMPHOCYTES #: 1.2 10^3/UL (ref 0.8–2.9)
LYMPHOCYTES %: 17.9 % (ref 15–51)
MEAN CORPUSCULAR HEMOGLOBIN: 26.9 PG (ref 29–33)
MEAN CORPUSCULAR HGB CONC: 30.9 G/DL (ref 32–37)
MEAN CORPUSCULAR VOLUME: 87.1 FL (ref 82–101)
MEAN PLATELET VOLUME: 12 FL (ref 7.4–10.4)
MONOCYTE #: 0.4 10^3/UL (ref 0.3–0.9)
MONOCYTES %: 6.2 % (ref 0–11)
NEUTROPHIL #: 5 10^3/UL (ref 1.6–7.5)
NEUTROPHILS %: 72.1 % (ref 39–77)
NUCLEATED RED BLOOD CELLS #: 0 10^3/UL (ref 0–0)
NUCLEATED RED BLOOD CELLS%: 0 /100WBC (ref 0–0)
PLATELET COUNT: 128 10^3/UL (ref 140–415)
RED BLOOD COUNT: 3.72 10^6/UL (ref 4.2–5.4)
RED CELL DISTRIBUTION WIDTH: 14.6 % (ref 11.5–14.5)
WHITE BLOOD COUNT: 6.9 10^3/UL (ref 4.8–10.8)

## 2019-03-07 RX ADMIN — IBUPROFEN 1 MG: 600 TABLET ORAL at 05:57

## 2019-03-07 RX ADMIN — DOCUSATE SODIUM AND SENNOSIDES 1 TAB: 8.6; 5 TABLET, FILM COATED ORAL at 08:32

## 2019-03-07 RX ADMIN — IBUPROFEN 1 MG: 600 TABLET ORAL at 12:04

## 2019-03-07 RX ADMIN — MEASLES, MUMPS, AND RUBELLA VIRUS VACCINE LIVE 1 ML: 1000; 12500; 1000 INJECTION, POWDER, LYOPHILIZED, FOR SUSPENSION SUBCUTANEOUS at 12:05

## 2019-03-07 RX ADMIN — IBUPROFEN SCH MG: 600 TABLET ORAL at 05:57

## 2019-03-07 RX ADMIN — DOCUSATE SODIUM AND SENNOSIDES SCH TAB: 8.6; 5 TABLET, FILM COATED ORAL at 08:32

## 2019-03-07 RX ADMIN — IBUPROFEN 1 MG: 600 TABLET ORAL at 00:15

## 2019-03-07 RX ADMIN — IBUPROFEN SCH MG: 600 TABLET ORAL at 00:15

## 2019-03-07 RX ADMIN — IBUPROFEN SCH MG: 600 TABLET ORAL at 12:04

## 2019-03-07 RX ADMIN — CLOSTRIDIUM TETANI TOXOID ANTIGEN (FORMALDEHYDE INACTIVATED), CORYNEBACTERIUM DIPHTHERIAE TOXOID ANTIGEN (FORMALDEHYDE INACTIVATED), BORDETELLA PERTUSSIS TOXOID ANTIGEN (GLUTARALDEHYDE INACTIVATED), BORDETELLA PERTUSSIS FILAMENTOUS HEMAGGLUTININ ANTIGEN (FORMALDEHYDE INACTIVATED), BORDETELLA PERTUSSIS PERTACTIN ANTIGEN, AND BORDETELLA PERTUSSIS FIMBRIAE 2/3 ANTIGEN 1 ML: 5; 2; 2.5; 5; 3; 5 INJECTION, SUSPENSION INTRAMUSCULAR at 11:07

## 2019-03-07 NOTE — DS
Date/Time of Note


Date/Time of Note


DATE: 3/7/19 


TIME: 07:50





Obstetrical Discharge Record


Final Diagnosis


Final Diagnosis:  Term delivered





Vaginal Delivery


Obstetrical Delivery:  Spontaneous





Complications


Other (hypertension )


Augmentation:  Yes


Induction:  Yes


 Rupture of Membranes:  No





Condition on Discharge


Physical Assessment


Voiding:  Yes


Bowel Movement:  Yes


Breast:  Soft, non-tender, Filling


Fundus:  Firm


Abdomen and Incision:


soft, not tender


Calf Tenderness:  No


Patient Condition:  Good











THAI MOHAN MD             Mar 7, 2019 07:50

## 2019-03-11 NOTE — NSTRPT
===================================

NST Information

===================================

Datetime Report Generated by CPN: 03/11/2019 14:31

   

   

===========================

Datetime: 03/04/2019 10:18

===========================

   

   

===================================

NST Information

===================================

   

 EGA:  37.6

 Test Number:  4

 Time on Monitor:  03/04/2019 10:52

 Time off Monitor:  03/04/2019 11:45

 NST Duration (Min):  53

 Reason for NST:  Diabetes Mellitus; Low CR; Other

   Reason for NST Other:  A1DM

 Test and Monitor Explained:  Monitor Explained; Test Explained; Verbalized Understanding

 Pulse:  76

 Resp:  18

 SBP:  113

 DBP:  65

   

===================================

Test Evaluation

===================================

   

 NST Interventions:  Acoustic Stimulation

 Patient States Fetal Movement:  Present

 Contraction Frequency:  X3-DENIES FEELING

 FHR Baseline :  140

 Variability:  Moderate 6-25bpm

 Accelerations:  15X15

 Decelerations:  Late; Variable

 FHR Category:  Category II

 NST Results:  Non-Reactive

 Provider Notified:  Dr Gabriel _ Dr Manrique





 Comments:  To u/s-VANESSA 18.8 CM, CEPHALIC. FBS ON 03/03/19 WAS 98. TRACING PER DR GABRIEL "NON-REACTIVE 
WITH VARIABLE DECEL WITH A LATE COMPONENT". DR DELSHAD NOTIFIED, PT SENT TO L_D FOR DELIVERY

   

===================================

Electronically Signed By

===================================

   

 E-Signature:  Electronically signed by Estephania Gabriel MD on 3/11/2019 at 14:16  with User ID: [id number]

   

===========================

Datetime: 02/28/2019 10:05

===========================

   

   

===================================

NST Information

===================================

   

 EGA:  37.2

 NST Duration (Min):  23

   

===========================

Datetime: 02/25/2019 10:14

===========================

   

   

===================================

NST Information

===================================

   

 EGA:  36.6

 NST Duration (Min):  26

   

===========================

Datetime: 02/21/2019 15:05

===========================

   

   

===================================

NST Information

===================================

   

 EGA:  36.2

 NST Duration (Min):  31
